# Patient Record
Sex: MALE | NOT HISPANIC OR LATINO | Employment: OTHER | URBAN - METROPOLITAN AREA
[De-identification: names, ages, dates, MRNs, and addresses within clinical notes are randomized per-mention and may not be internally consistent; named-entity substitution may affect disease eponyms.]

---

## 2023-11-27 ENCOUNTER — EVALUATION (OUTPATIENT)
Dept: PHYSICAL THERAPY | Facility: CLINIC | Age: 68
End: 2023-11-27
Payer: MEDICARE

## 2023-11-27 DIAGNOSIS — M12.812 ROTATOR CUFF ARTHROPATHY OF LEFT SHOULDER: ICD-10-CM

## 2023-11-27 DIAGNOSIS — S43.432D LABRAL TEAR OF SHOULDER, LEFT, SUBSEQUENT ENCOUNTER: Primary | ICD-10-CM

## 2023-11-27 DIAGNOSIS — M25.512 PAIN IN LEFT SHOULDER: ICD-10-CM

## 2023-11-27 PROCEDURE — 97110 THERAPEUTIC EXERCISES: CPT | Mod: GP | Performed by: PHYSICAL THERAPIST

## 2023-11-27 PROCEDURE — 97161 PT EVAL LOW COMPLEX 20 MIN: CPT | Mod: GP | Performed by: PHYSICAL THERAPIST

## 2023-11-27 ASSESSMENT — PATIENT HEALTH QUESTIONNAIRE - PHQ9
SUM OF ALL RESPONSES TO PHQ9 QUESTIONS 1 AND 2: 0
1. LITTLE INTEREST OR PLEASURE IN DOING THINGS: NOT AT ALL
2. FEELING DOWN, DEPRESSED OR HOPELESS: NOT AT ALL

## 2023-11-27 ASSESSMENT — ENCOUNTER SYMPTOMS
OCCASIONAL FEELINGS OF UNSTEADINESS: 0
DEPRESSION: 0
LOSS OF SENSATION IN FEET: 0

## 2023-11-27 NOTE — PROGRESS NOTES
Physical Therapy Evaluation    Patient Name: Hermann Lee  MRN: [id number]  Today's Date: 11/27/2023  Visit: 1/mn  Medicare Certification Dates:  11/27/23 - 1/27/24  Referred by:   Sy Locke  Time Calculation  Start Time: 1407  Stop Time: 1450  Time Calculation (min): 43 min  Diagnosis:   1. Rotator cuff arthropathy of left shoulder        2. Pain in left shoulder  Referral to Physical Therapy      3. Labral tear of shoulder, left, subsequent encounter                         PMH  - HTN, high cholesterol  - previously had skin CA on R forearm that was removed    HPI  He slipped on ice in March 2023 and landed on the L shoulder and arm.  He had immediate pain but did not need to go the ER.  He tried stretching but it hasn't helped.  He went to his MD a few weeks ago and was referred to PT.  No imaging done at this time.    Precautions  None    SUBJECTIVE  He reports soreness of the L shoulder but does not lack any motion or strength.  It is rated 0-5/10 and is located at the lateral L upper arm.  It catches when trying to lift over head at times.   It occurs intermittently.  The pain worsens with over head positions or after an activity.  It decreases by applying a pain patch (he cannot remember the name of it).  Functionally he is limited in sleeping, dressing and yard work.    Patient's Living Environment  Lives with his wife in a 2 story home.  He does have grab bars in his shower.      Previous Level of Function  No issues prior to March 2023    Patient's Therapy Goals  - resolve pain    OBJECTIVE  Observation:    - protracted shld on L.  L humeral head sits anterior  - atrophy of L RC mm notes.    Palpation:   - no pain    AROM:  - L shld flex = 135 with grinding in the shld with return (145 R).  With next attempt at flex, the shld caught and could not raise above 90 with scap compensation occurring into elevation.  Once the humeral head shifts into place again he can raise the arm over his head again.  abd  = 140 (145 R), ER = 70 and IR = 60.  - L shd PROM = WFL all dir with pain at end IR only    MMT:  - L shld flex = 3/5, abd = 4/5, ER = 3/5, IR = 5/5    Special Tests:  - Negative lift off and clunk test  -Positive jerks, speeds, cross over, empty can and GHL testing    Joint Mobility:   Increased motion of humeral head anteriorly    ASSESSMENT  Presents with S&S consistent with a L RC and labral injury effecting ROM, strength, humeral head position and humeral mechanics/coordination.  This causes his shoulder to catch, limiting ROM for over head ADLS.  He also has weakness and atrophy of the L RC mm which also effect movement and coordination of the shoulder joint.  He will benefit from physical therapy to improve pain, humeral positioning/mechanics and strength/stability of the L shld for return to all ADLS.    PLAN  2x/wk for 6 wks    TODAY'S TREATMENT  - evaluation of the L shld completed.  He was educated on his dx and the anatomy of the shoulder.  - he was given a HEP as seen below:    Access Code: [email or web address]  URL: [email or web address]  Date: 11/27/2023  Prepared by: Lili Hubbard    Exercises  - Seated Scapular Retraction  - 1 x daily - 7 x weekly - 3 sets - 10 reps  - Standing Isometric Shoulder Internal Rotation at Doorway  - 1 x daily - 7 x weekly - 3 sets - 10 reps  - Isometric Shoulder Extension at Wall  - 1 x daily - 7 x weekly - 3 sets - 10 reps  - Isometric Shoulder External Rotation at Wall  - 1 x daily - 7 x weekly - 3 sets - 10 reps  - Isometric Shoulder Abduction at Wall  - 1 x daily - 7 x weekly - 3 sets - 10 reps    GOALS:   he will be independent with his HEP  2.  achieve full AROM for dressing and over head ADLS  3.  demonstrate proper humeral head positioning to promote correct mechanics  4.  demonstrate shoulder flexion with proper humeral mechanics/coordination during shoulder flexion( without the humeral head shifting out of position).  5.  achieve 4+/5 RC strength  for lifting and over head ADLS

## 2023-11-27 NOTE — Clinical Note
January 10, 2024    Sy Locke DO  [address]  [place] [address]    Patient: Hermann Lee   YOB: 1955   Date of Visit: 11/27/2023       Dear Sy Locke Do  [address] [place] [address]    The attached plan of care is being sent to you because your patient’s medical reimbursement requires that you certify the plan of care. Your signature is required to allow uninterrupted insurance coverage.      You may indicate your approval by signing below and faxing this form back to us at Dept Fax: [phone].    Please call Dept: [phone] with any questions or concerns.    Thank you for this referral,        Lili Hubbard, PT  [facility] [address]  [address] [facility] [address]    Payer: Payor: MEDICARE / Plan: MEDICARE PART A AND B / Product Type: *No Product type* /                                                                         Date:     Dear Lili Hubbard, PT,     Re: Mr. Hermann Lee, MRN:[id number]    I certify that I have reviewed the attached plan of care and it is medically necessary for Mr. Hermann Lee (1955) who is under my care.          ______________________________________                    _________________  Provider name and credentials                                           Date and time                                                                                           Plan of Care 11/27/23   Effective from: 11/27/2023  Effective to: 1/27/2024    Active  Plan ID: [id number]           Participants as of 1/10/2024    Name Type Comments Contact Info    Sy Locke DO PCP - General  [phone]    Lili Hubbard, PT Consulting Physician  [phone]      Last Plan Note     Author: Lili Hubbard, PT Status: Signed Last edited: 12/28/2023  2:00 PM         Physical Therapy Treatment    Patient Name: Hermann Lee  MRN: [id number]  Today's Date: 12/28/2023   Visit 9/mn  Medicare Cert  Dates:  11/27/23 - 1/27/24  Referred by:  Sy Locke  Time Calculation  Start Time: 1402  Stop Time: 1452  Time Calculation (min): 50 min  Diagnosis:   1. Rotator cuff arthropathy of left shoulder        2. Labral tear of shoulder, left, subsequent encounter        3. Pain in left shoulder              SUBJECTIVE:  1-2/10 pain at the lateral L shld.  It is rated 2/10 in the morning and then decreases to 1/10.  He did well after last rx.  His flex AROM is improved and he has less difficulty at mid range.  This is still challenging during abd.     HEP compliance: yes    OBJECTIVE:  - PROM = WFL all dir  - AROM ER = 70, IR = 60, flex = 155, abd = 155  - MMT ER = 3+/5, IR = 5/5, abd = 4/5 with pain, flex =3/5  - Negative lift off, cross over and jerks  - Positive drop arm, empty can    Treatment:  -  continued with strength exercise for RC and scap stab mm.  Therapeutic Exercise  Therapeutic Exercise Activity 4: UBE, L4.5, 5 min  Therapeutic Exercise Activity 5: rows 50 lbs, 3x10  Therapeutic Exercise Activity 6: 6-way shld, GN TB, (no flex) 2 x10  Therapeutic Exercise Activity 7: lat pull down, 50 lbs, 3x10              - he was given a HEP to continue independently as seen below:    Access Code: [email or web address]  URL: [email or web address]  Date: 12/28/2023  Prepared by: Lili Hubbard    Exercises  - Standing Shoulder Row with Anchored Resistance  - 1 x daily - 4 x weekly - 3 sets - 10 reps  - Seated Lat Pull Down with Resistance - Elbows Bent  - 1 x daily - 4 x weekly - 3 sets - 10 reps  - Shoulder Extension with Resistance  - 1 x daily - 4 x weekly - 3 sets - 10 reps  - Standing Shoulder Horizontal Adduction with Anchored Resistance  - 1 x daily - 4 x weekly - 3 sets - 10 reps  - Sidelying Shoulder ER with Towel and Dumbbell  - 1 x daily - 4 x weekly - 3 sets - 10 reps    ASSESSMENT:  He has progressed very well in regard to pain and AROM.  However he still shows significant weakness into ER and  abd.  At times flex is hard to achieve as well.  Drop arm and empty can test are negative.  These signs indicate a RCT which is consistent with his mechanism of injury and continued limitations in therapy.  Recommend he see his family MD and ortho for imaging to r/o RCT.  As he is not reporting significant functional limitations he may opt to not have surgery even if a tear is present.  However, I think it would be good to have imaging done to determine the cause of his limitations.    PLAN:  Referred to his family MD and ortho.  ( He was given information for Dr. Bagley, Dr. Yeh, Dr. Benoit, Dr. Chi and Dr. Wren).  Hold PT until imaging is done.

## 2023-11-27 NOTE — LETTER
November 27, 2023    Sy Locke DO  [address] [facility] [address]    Patient: Hermann Lee   YOB: 1955   Date of Visit: 11/27/2023       Dear No referring provider defined for this encounter.    The attached plan of care is being sent to you because your patient’s medical reimbursement requires that you certify the plan of care. Your signature is required to allow uninterrupted insurance coverage.      You may indicate your approval by signing below and faxing this form back to us at Dept Fax: [phone].    Please call Dept: [phone] with any questions or concerns.    Thank you for this referral,        Lili Hubbard, PT  [facility] [address]  [address] [facility] [address]    Payer: Payor: MEDICARE / Plan: MEDICARE PART A AND B / Product Type: *No Product type* /                                                                         Date:     Dear Lili Hubbard, PT,     Re: Mr. Hermann Lee, MRN:[id number]    I certify that I have reviewed the attached plan of care and it is medically necessary for Mr. Hermann Lee (1955) who is under my care.          ______________________________________                    _________________  Provider name and credentials                                           Date and time                                                                                      The following plan is in draft form.  Please refer to the current version for the most up-to-date information.                Plan of Care 11/27/23   Effective from: 11/27/2023  Effective to: 1/27/2024    Draft  Plan ID: [id number]               Participants as of 11/27/2023      Name Type Comments Contact Info    Sy Locke DO PCP - General  [phone]    Lili Hubbard, PT Consulting Physician  [phone]          Last Plan Note       Author: Lili Hubbard PT Status: Incomplete Last edited: 11/27/2023  2:00 PM          Physical Therapy Evaluation    Patient Name: Hermann Lee  MRN: [id number]  Today's Date: 11/27/2023  Visit: 1/mn  Medicare Certification Dates:  11/27/23 - 1/27/23  Referred by:   Sy Locke  Time Calculation  Start Time: 1407  Stop Time: 1450  Time Calculation (min): 43 min  Diagnosis:   1. Rotator cuff arthropathy of left shoulder        2. Pain in left shoulder  Referral to Physical Therapy      3. Labral tear of shoulder, left, subsequent encounter                         PMH  - HTN, high cholesterol  - previously had skin CA on R forearm that was removed    HPI  He slipped on ice in March 2023 and landed on the L shoulder and arm.  He had immediate pain but did not need to go the ER.  He tried stretching but it hasn't helped.  He went to his MD a few weeks ago and was referred to PT.  No imaging done at this time.    Precautions  None    SUBJECTIVE  He reports soreness of the L shoulder but does not lack any motion or strength.  It is rated 0-5/10 and is located at the lateral L upper arm.  It catches when trying to lift over head at times.   It occurs intermittently.  The pain worsens with over head positions or after an activity.  It decreases by applying a pain patch (he cannot remember the name of it).  Functionally he is limited in sleeping, dressing and yard work.    Patient's Living Environment  Lives with his wife in a 2 story home.  He does have grab bars in his shower.      Previous Level of Function  No issues prior to March 2023    Patient's Therapy Goals  - resolve pain    OBJECTIVE  Observation:    - protracted shld on L.  L humeral head sits anterior  - atrophy of L RC mm notes.    Palpation:   - no pain    AROM:  - L shld flex = 135 with grinding in the shld with return (145 R).  With next attempt at flex, the shld caught and could not raise above 90 with scap compensation occurring into elevation.  Once the humeral head shifts into place again he can raise the arm over his head again.   abd = 140 (145 R), ER = 70 and IR = 60.  - L shd PROM = WFL all dir with pain at end IR only    MMT:  - L shld flex = 3/5, abd = 4/5, ER = 3/5, IR = 5/5    Special Tests:  - Negative lift off and clunk test  -Positive jerks, speeds, cross over, empty can and GHL testing    Joint Mobility:   Increased motion of humeral head anteriorly    ASSESSMENT  Presents with S&S consistent with a L RC and labral injury effecting ROM, strength, humeral head position and humeral mechanics/coordination.  This causes his shoulder to catch, limiting ROM for over head ADLS.  He also has weakness and atrophy of the L RC mm which also effect movement and coordination of the shoulder joint.  He will benefit from physical therapy to improve pain, humeral positioning/mechanics and strength/stability of the L shld for return to all ADLS.    PLAN  2x/wk for 6 wks    TODAY'S TREATMENT  - evaluation of the L shld completed.  He was educated on his dx and the anatomy of the shoulder.  - he was given a HEP as seen below:    Access Code: [email or web address]  URL: [email or web address]  Date: 11/27/2023  Prepared by: Lili Hubbard    Exercises  - Seated Scapular Retraction  - 1 x daily - 7 x weekly - 3 sets - 10 reps  - Standing Isometric Shoulder Internal Rotation at Doorway  - 1 x daily - 7 x weekly - 3 sets - 10 reps  - Isometric Shoulder Extension at Wall  - 1 x daily - 7 x weekly - 3 sets - 10 reps  - Isometric Shoulder External Rotation at Wall  - 1 x daily - 7 x weekly - 3 sets - 10 reps  - Isometric Shoulder Abduction at Wall  - 1 x daily - 7 x weekly - 3 sets - 10 reps    GOALS:   he will be independent with his HEP  2.  achieve full AROM for dressing and over head ADLS  3.  demonstrate proper humeral head positioning to promote correct mechanics  4.  demonstrate shoulder flexion with proper humeral mechanics/coordination during shoulder flexion( without the humeral head shifting out of position).  5.  achieve 4+/5 RC  strength for lifting and over head ADLS

## 2023-12-04 ENCOUNTER — TREATMENT (OUTPATIENT)
Dept: PHYSICAL THERAPY | Facility: CLINIC | Age: 68
End: 2023-12-04
Payer: MEDICARE

## 2023-12-04 DIAGNOSIS — M12.812 ROTATOR CUFF ARTHROPATHY OF LEFT SHOULDER: ICD-10-CM

## 2023-12-04 DIAGNOSIS — M25.512 PAIN IN LEFT SHOULDER: ICD-10-CM

## 2023-12-04 DIAGNOSIS — S43.432D LABRAL TEAR OF SHOULDER, LEFT, SUBSEQUENT ENCOUNTER: Primary | ICD-10-CM

## 2023-12-04 PROCEDURE — 97110 THERAPEUTIC EXERCISES: CPT | Mod: GP | Performed by: PHYSICAL THERAPIST

## 2023-12-04 NOTE — PROGRESS NOTES
Physical Therapy Treatment    Patient Name: Hermann Lee  MRN: [id number]  Today's Date: 12/4/2023   Visit 2/mn  Medicare Cert Dates:   11/27/23 - 1/27/24  Referred by:   Sy Locke  Time Calculation  Start Time: 1404  Stop Time: 1453  Time Calculation (min): 49 min  Diagnosis:   1. Labral tear of shoulder, left, subsequent encounter        2. Rotator cuff arthropathy of left shoulder        3. Pain in left shoulder              SUBJECTIVE:  He is feeling better.  2/10 L anterior/lateral shld pain that occurs with certain movements.  It is worse in the a.m. after having slept on it.  HEP compliance: yes    OBJECTIVE:  - continue to note atrophy of the L RC mm.  - challenged by ER in sidelying due to weakness.  Could not raise 2 lb weight to neutral    Treatment:  - initiated exercise for scap stab, posture, RC strengthening and stability of the GHJ.  Therapeutic Exercise  Therapeutic Exercise Activity 1: rhythm stab at 90 degrees of flex,  IR/ER at side  30 sec x2  Therapeutic Exercise Activity 2: S/L L shld ER, 2 lbs, 2x10  Therapeutic Exercise Activity 3: prone shld ext, 2x10  Therapeutic Exercise Activity 4: UBE, L4, 5 min  Therapeutic Exercise Activity 5: rows 45 lbs, 2x10  Therapeutic Exercise Activity 6: 6-way L shld iso with GN TB x10  Manual Therapy  Manual Therapy Activity 1: PROM all dir L shld  Manual Therapy Activity 2: post glide L GHJ             ASSESSMENT:  No pain after rx.  He needed cues for form but did not have pain.  He was challenged by ER due to RC weakness.  He will benefit from continued therapy to continue working on RC strength and shld stab due to suspected labral and RC injury.    PLAN:  Increase reps as nghia.

## 2023-12-06 ENCOUNTER — TREATMENT (OUTPATIENT)
Dept: PHYSICAL THERAPY | Facility: CLINIC | Age: 68
End: 2023-12-06
Payer: MEDICARE

## 2023-12-06 DIAGNOSIS — M25.512 PAIN IN LEFT SHOULDER: ICD-10-CM

## 2023-12-06 DIAGNOSIS — S43.432D LABRAL TEAR OF SHOULDER, LEFT, SUBSEQUENT ENCOUNTER: Primary | ICD-10-CM

## 2023-12-06 DIAGNOSIS — M12.812 ROTATOR CUFF ARTHROPATHY OF LEFT SHOULDER: ICD-10-CM

## 2023-12-06 PROCEDURE — 97110 THERAPEUTIC EXERCISES: CPT | Mod: GP | Performed by: PHYSICAL THERAPIST

## 2023-12-06 NOTE — PROGRESS NOTES
Physical Therapy Treatment    Patient Name: Hermann Lee  MRN: [id number]  Today's Date: 12/6/2023   Visit 3/mn  Medicare Cert Dates:  11/27/23 - 1/27/24  Referred by:   Sy Locke     Diagnosis:   1. Labral tear of shoulder, left, subsequent encounter        2. Rotator cuff arthropathy of left shoulder        3. Pain in left shoulder              SUBJECTIVE:  0/10 pain at left shoulder even when moving it.  He is still avoiding lifting.    HEP compliance: yes    OBJECTIVE:  - L shld AROM ER = 78, IR = 60, Flex = 80 (due to the shoulder being out of alignment).  Flex increased to 120 after doing pulley flex exercise, abd = 150  - L shld PROm = WNL all dir    Treatment:  - continued with exercise for ROM, posture, strength and shld stability/mechanics.  Added pulleys to gain flex ROM.  Added supine shld flex with weight to work on  mechanics and strength in a gravity eliminated position.  Decreased weight for S/L ER as 2 lbs was very challenging during last session.  Increased reps of exercise as nghia.    Therapeutic Exercise  Therapeutic Exercise Activity 2: S/L L shld ER, 2 lbs, 2x10  Therapeutic Exercise Activity 3: prone shld ext, 3x10  Therapeutic Exercise Activity 4: UBE, L1, 5 min  Therapeutic Exercise Activity 5: rows 45 lbs, 3x10  Therapeutic Exercise Activity 6: 6-way L shld iso with GN TB x10  Therapeutic Exercise Activity 7: pulleys, flex, 2x10  Therapeutic Exercise Activity 8: supine shld flex, 2 lbs, 2x10  Manual Therapy  Manual Therapy Activity 1: PROM all dir L shld  Manual Therapy Activity 2: post and inf glide L GHJ           - HEP updated as seen below:    Access Code: [email or web address]  URL: [email or web address]  Date: 12/06/2023  Prepared by: Lili Hubbard    Exercises  - Standing Shoulder Flexion Reactive Isometrics with Elbow Extended  - 1 x daily - 7 x weekly - 3 sets - 10 reps  - Supine Shoulder Flexion with Free Weight  - 1 x daily - 4 x weekly - 3 sets - 10 reps  -  Prone Scapular Slide with Shoulder Extension  - 1 x daily - 7 x weekly - 3 sets - 10 reps  - Shoulder External Rotation Reactive Isometrics  - 1 x daily - 7 x weekly - 3 sets - 10 reps    ASSESSMENT:  Good tolerance of new exercises.  Continues to have catching with altered mechanics at approximately 90 degrees of flexion.  He is able to move past that point when focusing on mechanics.  He is progressing and demonstrates much improved symptoms.  He will benefit from continued therapy to improve shoulder ROM into flex as well as stability and strength for ADLS.    PLAN:  Continue with current exercises.

## 2023-12-11 ENCOUNTER — TREATMENT (OUTPATIENT)
Dept: PHYSICAL THERAPY | Facility: CLINIC | Age: 68
End: 2023-12-11
Payer: MEDICARE

## 2023-12-11 DIAGNOSIS — M25.512 PAIN IN LEFT SHOULDER: ICD-10-CM

## 2023-12-11 DIAGNOSIS — M12.812 ROTATOR CUFF ARTHROPATHY OF LEFT SHOULDER: ICD-10-CM

## 2023-12-11 DIAGNOSIS — S43.432D LABRAL TEAR OF SHOULDER, LEFT, SUBSEQUENT ENCOUNTER: Primary | ICD-10-CM

## 2023-12-11 PROCEDURE — 97010 HOT OR COLD PACKS THERAPY: CPT | Mod: GP | Performed by: PHYSICAL THERAPIST

## 2023-12-11 PROCEDURE — 97110 THERAPEUTIC EXERCISES: CPT | Mod: GP | Performed by: PHYSICAL THERAPIST

## 2023-12-11 NOTE — PROGRESS NOTES
Physical Therapy Treatment    Patient Name: Hermann Lee  MRN: [id number]  Today's Date: 12/11/2023   Visit 4/mn Medicare Cert Dates:  11/27/23 - 1/27/24  Referred by:  Sy correia  Time Calculation  Start Time: 1443  Stop Time: 1545  Time Calculation (min): 62 min  Diagnosis:   1. Labral tear of shoulder, left, subsequent encounter        2. Rotator cuff arthropathy of left shoulder        3. Pain in left shoulder              SUBJECTIVE:  0/10 pain.  He did have some pain after raking his leaves a few days ago, but it resolved.  HEP compliance: yes    OBJECTIVE:  - L shld AROM flex = 135    Treatment:  - continued with exercises for RC strengthening and stabilizing the GHJ.  Advanced 6-way shld to isotonics  Therapeutic Exercise  Therapeutic Exercise Activity 2: S/L L shld ER, 1 lbs, 2x10  Therapeutic Exercise Activity 3: prone shld ext, 3x10  Therapeutic Exercise Activity 4: UBE, L4, 5 min  Therapeutic Exercise Activity 5: rows 45 lbs, 3x10  Therapeutic Exercise Activity 6: 6-way shd (no abd), GN TB, x10  Therapeutic Exercise Activity 7: pulleys, flex, 2x10  Therapeutic Exercise Activity 8: supine shld flex, 2 lbs, 2x10  Manual Therapy  Manual Therapy Activity 1: PROM all dir L shld  Manual Therapy Activity 2: post and inf glide L GHJ        Modalities  Modality 1: Untimed Cold Pack (CP to L shld for 10 min after exercise)    ASSESSMENT:  AROM flex has returned to 135 with improved mechanics at midrange (less catching at 90 degrees).  However, if he holds a weight, he cannot lift the arm forward above approximately 60 degrees.  He continues to present as a RC injury with possible labral injury after falling on ice in march.  We will continue 2-3 more sessions to see if further progress in ROM, strength and mechanics can be made.  At that time he will be referred back to his MD for imaging if no further improvement seen.    PLAN:  Continue with exercises for RC strengthening.  Add S/L abd.

## 2023-12-13 ENCOUNTER — TREATMENT (OUTPATIENT)
Dept: PHYSICAL THERAPY | Facility: CLINIC | Age: 68
End: 2023-12-13
Payer: MEDICARE

## 2023-12-13 DIAGNOSIS — S43.432D LABRAL TEAR OF SHOULDER, LEFT, SUBSEQUENT ENCOUNTER: Primary | ICD-10-CM

## 2023-12-13 DIAGNOSIS — M25.512 PAIN IN LEFT SHOULDER: ICD-10-CM

## 2023-12-13 DIAGNOSIS — M12.812 ROTATOR CUFF ARTHROPATHY OF LEFT SHOULDER: ICD-10-CM

## 2023-12-13 PROCEDURE — 97140 MANUAL THERAPY 1/> REGIONS: CPT | Mod: GP | Performed by: PHYSICAL THERAPIST

## 2023-12-13 PROCEDURE — 97010 HOT OR COLD PACKS THERAPY: CPT | Mod: GP | Performed by: PHYSICAL THERAPIST

## 2023-12-13 PROCEDURE — 97110 THERAPEUTIC EXERCISES: CPT | Mod: GP | Performed by: PHYSICAL THERAPIST

## 2023-12-13 NOTE — PROGRESS NOTES
Physical Therapy Treatment    Patient Name: Hermann Lee  MRN: [id number]  Today's Date: 12/13/2023   Visit 5/mn  Medicare Cert Dates:  11/27/23 - 1/27/24  Referred by:   Sy Locke  Time Calculation  Start Time: 1406  Stop Time: 1455  Time Calculation (min): 49 min  Diagnosis:   1. Labral tear of shoulder, left, subsequent encounter        2. Rotator cuff arthropathy of left shoulder        3. Pain in left shoulder              SUBJECTIVE:  He has good/bad days with the L shld.  It felt good all day yesterday, but has pain today.  He may have slept on his L side.  3/10 pain today at the lateral L shld that occurs intermittently.  He felt good after his last session.  Ice helps.    HEP compliance: yes    OBJECTIVE:  - L shld AROM flex = 142, abd = 143    Treatment:  - continued with exercise for L shld strength, ROM and mechanics.  Increased reps as nghia.  Added S/L abd.  Needed assist to stabilize scap and complete the motion as humeral mechanics were incorrect (scap elevation and rotating   Therapeutic Exercise  Therapeutic Exercise Activity 2: S/L L shld ER, 1 lbs, 2x10  Therapeutic Exercise Activity 3: prone L shld ext off side of table, 2 lbs, 2x10  Therapeutic Exercise Activity 4: UBE, L4, 5 min  Therapeutic Exercise Activity 5: rows 45 lbs, 3x10  Therapeutic Exercise Activity 6: 6-way shd (no abd), GN TB, x10  Therapeutic Exercise Activity 8: supine shld flex, 2 lbs, 3x10  Therapeutic Exercise Activity 9: S/L L shld abd x5 (with assist)  Manual Therapy  Manual Therapy Activity 1: PROM all dir L shld  Manual Therapy Activity 2: post and inf glide L GHJ        Modalities  Modality 1: Untimed Cold Pack (CP to L shld for 10 min in supine after exercise)    ASSESSMENT:  Demonstrates improved AROM into flex and abd.  Continued to suspect RC injury as strength continues to be an issue despite improve AROM.  any additional weight (1 lb) into abd or ER is very challenging and he cannot complete the range.  He  will benefit from continued therapy to improve ROM and strength for return to dressing, sleeping and yard work without pain.  Will continue to monitor progress and will refer back to MD if it plateaus.     PLAN:  Continue with RC strengthening.  Increase reps as nghia.

## 2023-12-18 ENCOUNTER — TREATMENT (OUTPATIENT)
Dept: PHYSICAL THERAPY | Facility: CLINIC | Age: 68
End: 2023-12-18
Payer: MEDICARE

## 2023-12-18 DIAGNOSIS — M12.812 ROTATOR CUFF ARTHROPATHY OF LEFT SHOULDER: ICD-10-CM

## 2023-12-18 DIAGNOSIS — M25.512 PAIN IN LEFT SHOULDER: ICD-10-CM

## 2023-12-18 DIAGNOSIS — S43.432D LABRAL TEAR OF SHOULDER, LEFT, SUBSEQUENT ENCOUNTER: Primary | ICD-10-CM

## 2023-12-18 PROCEDURE — 97110 THERAPEUTIC EXERCISES: CPT | Mod: GP | Performed by: PHYSICAL THERAPIST

## 2023-12-18 PROCEDURE — 97140 MANUAL THERAPY 1/> REGIONS: CPT | Mod: GP | Performed by: PHYSICAL THERAPIST

## 2023-12-18 NOTE — PROGRESS NOTES
Physical Therapy Treatment    Patient Name: Hermann Lee  MRN: [id number]  Today's Date: 12/18/2023   Visit 6/mn Medicare Cert Dates:  11/27/23 - 1/27/24  Referred by:   Sy Locke  Time Calculation  Start Time: 1232  Stop Time: 1316  Time Calculation (min): 44 min  Diagnosis:   1. Labral tear of shoulder, left, subsequent encounter        2. Rotator cuff arthropathy of left shoulder        3. Pain in left shoulder              SUBJECTIVE:  He did well over the weekend, but woke with pain today (maybe from his sleep position).  He used some ice and it helped.  1-2/10 pain now located at the lateral L shld.  HEP compliance: yes    OBJECTIVE:  - L shld AROM flex = 150 B (difficulty with transitioning over head when reaching about 90 degrees on the L side)  - L shld PROM = WNL all dir    Treatment:  - continued exercises for L shld ROM and strength.  Emphasized strength and proper mechanics at mid range movement.  Therapeutic Exercise  Therapeutic Exercise Activity 2: S/L L shld ER, 1 lbs, 3x10  Therapeutic Exercise Activity 3: prone L shld ext off side of table, 2 lbs, 3x10  Therapeutic Exercise Activity 4: UBE, L4, 5 min  Therapeutic Exercise Activity 5: rows 50 lbs, 3x10  Therapeutic Exercise Activity 6: 6-way shld, GN TB, x10  Therapeutic Exercise Activity 8: supine shld flex, 2 lbs, 3x10  Therapeutic Exercise Activity 9: S/L L shld abd [id number] reps as nghia.  Manual Therapy  Manual Therapy Activity 1: PROM all dir L shld             ASSESSMENT:  Good tolerance of advanced exercises.  Demonstrates equal AROM into flex at B shlds.  However, he still has difficulty at midrange flex and abd as well as ER due to RC weakness.  Today he required less assistance for sidelying abd.  He will benefit from continued therapy to further improve RC strength for over head and lifting ADLS.    PLAN:  Continue to strengthen into flex, abd and ER.

## 2023-12-20 ENCOUNTER — TREATMENT (OUTPATIENT)
Dept: PHYSICAL THERAPY | Facility: CLINIC | Age: 68
End: 2023-12-20
Payer: MEDICARE

## 2023-12-20 DIAGNOSIS — M25.512 PAIN IN LEFT SHOULDER: ICD-10-CM

## 2023-12-20 DIAGNOSIS — M12.812 ROTATOR CUFF ARTHROPATHY OF LEFT SHOULDER: ICD-10-CM

## 2023-12-20 DIAGNOSIS — S43.432D LABRAL TEAR OF SHOULDER, LEFT, SUBSEQUENT ENCOUNTER: Primary | ICD-10-CM

## 2023-12-20 PROCEDURE — 97110 THERAPEUTIC EXERCISES: CPT | Mod: GP | Performed by: PHYSICAL THERAPIST

## 2023-12-20 NOTE — PROGRESS NOTES
"  Physical Therapy Treatment    Patient Name: Hermann Lee  MRN: [id number]  Today's Date: 12/20/2023   Visit 7/mn Medicare Cert Dates:  11/27/23 - 1/27/24  Referred by:  Sy Locke  Time Calculation  Start Time: 1417  Stop Time: 1503  Time Calculation (min): 46 min  Diagnosis:   1. Labral tear of shoulder, left, subsequent encounter        2. Rotator cuff arthropathy of left shoulder        3. Pain in left shoulder              SUBJECTIVE:  He shoveled snow yesterday and that caused L shld pain.  He iced it and it feels \"pretty good\" now.  1-2/10 pain at the lateral L shld that occurs with movement.    HEP compliance: yes    OBJECTIVE:  - L shld AROM flex = 150, abd = 145, ER = 75 and IR = 65  - PROM L shld = WFL all dir  - L shld MMT flex = 4-/5 with crepitus, abd = 4/5 with pain, IR = 5/5 and ER = 3+/5.    Treatment:  - continued with RC strengthening and shld stabilizing exercises.  Increased reps of 6-way shld.  Therapeutic Exercise  Therapeutic Exercise Activity 2: S/L L shld ER, 1 lbs, 3x10  Therapeutic Exercise Activity 3: prone L shld ext off side of table, 2 lbs, 3x10  Therapeutic Exercise Activity 4: UBE, L4.5, 5 min  Therapeutic Exercise Activity 5: rows 50 lbs, 3x10  Therapeutic Exercise Activity 6: 6-way shld, GN TB, (no flex) 2 x10  Therapeutic Exercise Activity 8: supine shld flex, 2 lbs, 3x10  Therapeutic Exercise Activity 9: S/L L shld abd x10                ASSESSMENT:  Continue to suspect labral and RC injury - possible tear.  However he demonstrates improvement with treatment thus far.  AROM equals the R and He has increased strength into flex and ER.  Significant weakness still present into ER and abd, however as expected with a RC tear.  We will continue in therapy for approximately 2 more sessions to see if further progress is made.  If not, he will be referred back to his MD for further imaging.    PLAN:  Continued with strength exercise.    Refer back to MD in approximately 2 sessions " depending on progress.

## 2023-12-26 ENCOUNTER — TREATMENT (OUTPATIENT)
Dept: PHYSICAL THERAPY | Facility: CLINIC | Age: 68
End: 2023-12-26
Payer: MEDICARE

## 2023-12-26 DIAGNOSIS — M25.512 PAIN IN LEFT SHOULDER: ICD-10-CM

## 2023-12-26 DIAGNOSIS — S43.432D LABRAL TEAR OF SHOULDER, LEFT, SUBSEQUENT ENCOUNTER: ICD-10-CM

## 2023-12-26 DIAGNOSIS — M12.812 ROTATOR CUFF ARTHROPATHY OF LEFT SHOULDER: Primary | ICD-10-CM

## 2023-12-26 PROCEDURE — 97010 HOT OR COLD PACKS THERAPY: CPT | Mod: GP | Performed by: PHYSICAL THERAPIST

## 2023-12-26 PROCEDURE — 97110 THERAPEUTIC EXERCISES: CPT | Mod: GP | Performed by: PHYSICAL THERAPIST

## 2023-12-26 NOTE — PROGRESS NOTES
Physical Therapy Treatment    Patient Name: Hermann Lee  MRN: [id number]  Today's Date: 12/26/2023   Visit 8/mn  Medicare Cert Dates: 11/27/23 - 1/27/24  Referred by:  Sy Locke  Time Calculation  Start Time: 1402  Stop Time: 1500  Time Calculation (min): 58 min  Diagnosis:   1. Rotator cuff arthropathy of left shoulder        2. Labral tear of shoulder, left, subsequent encounter        3. Pain in left shoulder              SUBJECTIVE:  He did well all weekend.  A little sore in the morning.   It is rated 1-2/10 and located at the lateral L shld.   He did well after last session.    HEP compliance: yes    OBJECTIVE:  - PROM = WNL all dir  - AROM ER = 75, IR = 70, flex = 155, abd = 155    Treatment:  - continued with exercise for strengthening the L shld.  Added lat pull down.  Therapeutic Exercise  Therapeutic Exercise Activity 2: S/L L shld ER, 1 lbs, 3x10  Therapeutic Exercise Activity 3: prone L shld ext off side of table, 2 lbs, 3x10  Therapeutic Exercise Activity 4: UBE, L4.5, 5 min  Therapeutic Exercise Activity 5: rows 50 lbs, 3x10  Therapeutic Exercise Activity 6: 6-way shld, GN TB, (no flex) 2 x10  Therapeutic Exercise Activity 7: lat pull down, 50 lbs, 3x10  Therapeutic Exercise Activity 8: supine shld flex, 2 lbs, 3x10  Therapeutic Exercise Activity 9: S/L L shld abd 2x10  Manual Therapy  Manual Therapy Activity 1: PROM all dir L shld        Modalities  Modality 1: Untimed Cold Pack (CP to L shld for 10 min after exercise)    ASSESSMENT:  Demonstrates increased AROM into flex and abd.  Crepitus present primarily during abd.  He also compensates with scap elevation during abd due to suspected RCT.    PLAN:  Continue PT to increase RC strength.  His motion is improving, however I do still suspect a RCT.  May refer back to MD at next session.

## 2023-12-28 ENCOUNTER — TREATMENT (OUTPATIENT)
Dept: PHYSICAL THERAPY | Facility: CLINIC | Age: 68
End: 2023-12-28
Payer: MEDICARE

## 2023-12-28 DIAGNOSIS — M12.812 ROTATOR CUFF ARTHROPATHY OF LEFT SHOULDER: Primary | ICD-10-CM

## 2023-12-28 DIAGNOSIS — M25.512 PAIN IN LEFT SHOULDER: ICD-10-CM

## 2023-12-28 DIAGNOSIS — S43.432D LABRAL TEAR OF SHOULDER, LEFT, SUBSEQUENT ENCOUNTER: ICD-10-CM

## 2023-12-28 PROCEDURE — 97110 THERAPEUTIC EXERCISES: CPT | Mod: GP | Performed by: PHYSICAL THERAPIST

## 2023-12-28 NOTE — PROGRESS NOTES
Physical Therapy Treatment    Patient Name: Hermann Lee  MRN: [id number]  Today's Date: 12/28/2023   Visit 9/mn  Medicare Cert Dates:  11/27/23 - 1/27/24  Referred by:  Sy Locke  Time Calculation  Start Time: 1402  Stop Time: 1452  Time Calculation (min): 50 min  Diagnosis:   1. Rotator cuff arthropathy of left shoulder        2. Labral tear of shoulder, left, subsequent encounter        3. Pain in left shoulder              SUBJECTIVE:  1-2/10 pain at the lateral L shld.  It is rated 2/10 in the morning and then decreases to 1/10.  He did well after last rx.  His flex AROM is improved and he has less difficulty at mid range.  This is still challenging during abd.     HEP compliance: yes    OBJECTIVE:  - PROM = WFL all dir  - AROM ER = 70, IR = 60, flex = 155, abd = 155  - MMT ER = 3+/5, IR = 5/5, abd = 4/5 with pain, flex =3/5  - Negative lift off, cross over and jerks  - Positive drop arm, empty can    Treatment:  -  continued with strength exercise for RC and scap stab mm.  Therapeutic Exercise  Therapeutic Exercise Activity 4: UBE, L4.5, 5 min  Therapeutic Exercise Activity 5: rows 50 lbs, 3x10  Therapeutic Exercise Activity 6: 6-way shld, GN TB, (no flex) 2 x10  Therapeutic Exercise Activity 7: lat pull down, 50 lbs, 3x10              - he was given a HEP to continue independently as seen below:    Access Code: [email or web address]  URL: [email or web address]  Date: 12/28/2023  Prepared by: Lili Hubbard    Exercises  - Standing Shoulder Row with Anchored Resistance  - 1 x daily - 4 x weekly - 3 sets - 10 reps  - Seated Lat Pull Down with Resistance - Elbows Bent  - 1 x daily - 4 x weekly - 3 sets - 10 reps  - Shoulder Extension with Resistance  - 1 x daily - 4 x weekly - 3 sets - 10 reps  - Standing Shoulder Horizontal Adduction with Anchored Resistance  - 1 x daily - 4 x weekly - 3 sets - 10 reps  - Sidelying Shoulder ER with Towel and Dumbbell  - 1 x daily - 4 x weekly - 3 sets - 10  reps    ASSESSMENT:  He has progressed very well in regard to pain and AROM.  However he still shows significant weakness into ER and abd.  At times flex is hard to achieve as well.  Drop arm and empty can test are positive.  These signs indicate a RCT which is consistent with his mechanism of injury and continued limitations in therapy.  Recommend he see his family MD and ortho for imaging to r/o RCT.  As he is not reporting significant functional limitations he may opt to not have surgery even if a tear is present.  However, I think it would be good to have imaging done to determine the cause of his limitations.    PLAN:  Referred to his family MD and ortho.  ( He was given information for Dr. aBgley, Dr. Yeh, Dr. Benoit, Dr. Chi and Dr. Wren).  Hold PT until imaging is done.

## 2024-03-04 ENCOUNTER — DOCUMENTATION (OUTPATIENT)
Dept: PHYSICAL THERAPY | Facility: CLINIC | Age: 69
End: 2024-03-04
Payer: MEDICARE

## 2024-03-04 NOTE — PROGRESS NOTES
Physical Therapy    Discharge Summary    Name: Hermann Lee  MRN: [id number]  : 1955  Date: 24    Discharge Summary: PT    Discharge Information: Date of discharge 3/4/24, Date of last visit 23, Date of evaluation 23, Number of attended visits 9, Referred by Sy Locke, and Referred for L shld pain, RC injury, labral tear    Therapy Summary: at his last session he reported 1-2/10 pain at the lateral L shld.  His AROM improved and he had less difficulty at midrange flexion, but this persisted with abd.  He demonstrated:  - PROM = WFL all dir  - AROM ER = 70, IR = 60, flex = 155, abd = 155  - MMT ER = 3+/5, IR = 5/5, abd = 4/5 with pain, flex =3/5  - Negative lift off, cross over and jerks  - Positive drop arm, empty can    He has progressed very well in regard to pain and AROM.  However he still shows significant weakness into ER and abd.  At times flex is hard to achieve as well.  Drop arm and empty can test are positive.  These signs indicate a RCT which is consistent with his mechanism of injury and continued limitations in therapy.  Recommend he see his family MD and ortho for imaging to r/o RCT.  As he is not reporting significant functional limitations he may opt to not have surgery even if a tear is present.  However, I think it would be good to have imaging done to determine the cause of his limitations.   Therapy was put on hold until he saw his MD and had imaging.  He did not return to PT.      Rehab Discharge Reason: Other referred back to his MD - did not return to PT.

## 2024-10-31 ENCOUNTER — EVALUATION (OUTPATIENT)
Dept: PHYSICAL THERAPY | Facility: CLINIC | Age: 69
End: 2024-10-31
Payer: MEDICARE

## 2024-10-31 DIAGNOSIS — M51.369 DISC DEGENERATION, LUMBAR: ICD-10-CM

## 2024-10-31 DIAGNOSIS — M54.50 LOW BACK PAIN: Primary | ICD-10-CM

## 2024-10-31 PROCEDURE — 97161 PT EVAL LOW COMPLEX 20 MIN: CPT | Mod: GP | Performed by: PHYSICAL THERAPIST

## 2024-10-31 PROCEDURE — 97110 THERAPEUTIC EXERCISES: CPT | Mod: GP | Performed by: PHYSICAL THERAPIST

## 2024-10-31 ASSESSMENT — PATIENT HEALTH QUESTIONNAIRE - PHQ9
2. FEELING DOWN, DEPRESSED OR HOPELESS: NOT AT ALL
1. LITTLE INTEREST OR PLEASURE IN DOING THINGS: NOT AT ALL
SUM OF ALL RESPONSES TO PHQ9 QUESTIONS 1 AND 2: 0

## 2024-10-31 ASSESSMENT — ENCOUNTER SYMPTOMS
DEPRESSION: 0
LOSS OF SENSATION IN FEET: 0
OCCASIONAL FEELINGS OF UNSTEADINESS: 0

## 2024-11-05 ENCOUNTER — TREATMENT (OUTPATIENT)
Dept: PHYSICAL THERAPY | Facility: CLINIC | Age: 69
End: 2024-11-05
Payer: MEDICARE

## 2024-11-05 DIAGNOSIS — M51.369 DISC DEGENERATION, LUMBAR: ICD-10-CM

## 2024-11-05 DIAGNOSIS — M54.50 LOW BACK PAIN: ICD-10-CM

## 2024-11-05 PROCEDURE — 97110 THERAPEUTIC EXERCISES: CPT | Mod: GP | Performed by: PHYSICAL THERAPIST

## 2024-11-05 NOTE — PROGRESS NOTES
Physical Therapy Treatment    Patient Name: Hermann Lee  MRN: [id number]  Today's Date: 11/5/2024  Visit 2/mn  DOS/DOI:  4/1/24  Medicare Certification Dates:  10/31/24 - 12/31/24  Referred by:   Cesilia Bynum    Time Calculation  Start Time: 1751  Stop Time: 1835  Time Calculation (min): 44 min     PT Therapeutic Procedures Time Entry  Therapeutic Exercise Time Entry: 44                 Diagnosis:   1. Low back pain  Follow Up In Physical Therapy      2. Disc degeneration, lumbar  Follow Up In Physical Therapy            PRECAUTIONS:  none    SUBJECTIVE:  He reports constant 2-3/10 pain described as an ache at his low back.  HEP compliance: yes    OBJECTIVE:  - TA = fair    Treatment:  - initiated exercise for core stab.  Focused on TA, multifidus and gluts.  Exercises done in   Therapeutic Exercise  Therapeutic Exercise Activity 1: SKTC B, 30 sec x2  Therapeutic Exercise Activity 2: TA with hip add x10  Therapeutic Exercise Activity 3: knee tap 2 x10  Therapeutic Exercise Activity 4: bridge 2x10  Therapeutic Exercise Activity 5: hip abd on  multi hip machine, B, L3 2x10  Therapeutic Exercise Activity 6: quad LE ext x 5 B  Therapeutic Exercise Activity 7: supine SB for obliques, B x5  Therapeutic Exercise Activity 8: row, 40 lbs 2x10  Therapeutic Exercise Activity 9: shld ext in standing, 35 lbs 2x10              - additional exercises were added to his Hep as he will not be back to PT for about 10 days.    Access Code: [email or web address]  URL: [email or web address]  Date: 11/05/2024  Prepared by: Lili Hubbard    Exercises  - Quadruped Alternating Leg Extensions  - 1 x daily - 7 x weekly - 3 sets - 10 reps  - Curl Up with Reach  - 1 x daily - 7 x weekly - 3 sets - 10 reps    ASSESSMENT:  He reports a little aching in the low back after exercise but 0/10 pain during today's session.  He will benefit from continued PT to improve core stab for decreased pain when transferring from one position to  the next.      PLAN:  Increase reps as nghia.

## 2024-11-15 ENCOUNTER — TREATMENT (OUTPATIENT)
Dept: PHYSICAL THERAPY | Facility: CLINIC | Age: 69
End: 2024-11-15
Payer: MEDICARE

## 2024-11-15 DIAGNOSIS — M54.50 LOW BACK PAIN: ICD-10-CM

## 2024-11-15 DIAGNOSIS — M51.369 DISC DEGENERATION, LUMBAR: ICD-10-CM

## 2024-11-15 PROCEDURE — 97110 THERAPEUTIC EXERCISES: CPT | Mod: GP | Performed by: PHYSICAL THERAPIST

## 2024-11-15 NOTE — PROGRESS NOTES
Physical Therapy Treatment    Patient Name: Hermann Lee  MRN: [id number]  Today's Date: 11/15/2024  Visit 3/mn  DOS/DOI:  4/1/24  Medicare Certification Dates:  10/31/24 - 12/31/24  Referred by:   Cesilia Bynum    Time Calculation  Start Time: 1004  Stop Time: 1048  Time Calculation (min): 44 min     PT Therapeutic Procedures Time Entry  Manual Therapy Time Entry: 7  Therapeutic Exercise Time Entry: 37                 Diagnosis:   1. Low back pain  Follow Up In Physical Therapy      2. Disc degeneration, lumbar  Follow Up In Physical Therapy            PRECAUTIONS:  none    SUBJECTIVE:  Last weekend he had increased pain rated 5/10 in R LB.  It may have been from doing more sitting that day.  He had rakes leaves 2 day before but this may not be the cause.  It has reduced to 1/10.  HEP compliance: yes    OBJECTIVE:  - AROM lumbar flex = WNL, ext = 10 with R LBP, SB = WFL B, rot  = WFL B  - Negative slump    Treatment:  - continued with core stab.  Increased reps as nghia.  Therapeutic Exercise  Therapeutic Exercise Activity 1: SKTC B, 30 sec x2  Therapeutic Exercise Activity 2: TA with hip add x10  Therapeutic Exercise Activity 3: knee tap 3 x10  Therapeutic Exercise Activity 4: bridge 3x10  Therapeutic Exercise Activity 5: hip abd on multi hip machine, B, L3 2x10  Therapeutic Exercise Activity 6: quad LE ext x 10 B  Therapeutic Exercise Activity 7: supine SB for obliques, B x5  Therapeutic Exercise Activity 8: row, 40 lbs 2x10  Therapeutic Exercise Activity 9: shld ext in standing, 35 lbs 2x10  Manual Therapy  Manual Therapy Activity 1: R lower lumbar gap mob             ASSESSMENT:  Nghia rx well - no problem with increased reps during exercise and felt the manual therapy was helpful.  After rx he demonstrates lumbar ext without pain.  He did report a little soreness in the R low back after exercise.  He opted to ice at home vs in the clinic.  He will benefit from continued therapy to further improve core  stability.      PLAN:  Add oblique knee taps.

## 2024-11-21 ENCOUNTER — TREATMENT (OUTPATIENT)
Dept: PHYSICAL THERAPY | Facility: CLINIC | Age: 69
End: 2024-11-21
Payer: MEDICARE

## 2024-11-21 DIAGNOSIS — M54.50 LOW BACK PAIN: ICD-10-CM

## 2024-11-21 DIAGNOSIS — M51.369 DISC DEGENERATION, LUMBAR: ICD-10-CM

## 2024-11-21 PROCEDURE — 97110 THERAPEUTIC EXERCISES: CPT | Mod: GP | Performed by: PHYSICAL THERAPIST

## 2024-11-21 NOTE — PROGRESS NOTES
Physical Therapy Treatment    Patient Name: Hermann Lee  MRN: [id number]  Today's Date: 11/21/2024  Visit 4/m,n  DOS/DOI:  4/1/24  Medicare Certification Dates:  10/31/24 - 12/31/24  Referred by:   Cesilia Bynum     Time Calculation  Start Time: 1046  Stop Time: 1131  Time Calculation (min): 45 min     PT Therapeutic Procedures Time Entry  Manual Therapy Time Entry: 5  Therapeutic Exercise Time Entry: 40                 Diagnosis:   1. Low back pain  Follow Up In Physical Therapy      2. Disc degeneration, lumbar  Follow Up In Physical Therapy            PRECAUTIONS:  none    SUBJECTIVE:  1-2/10 pain at the L LB today.  The pain is intermittent.  The aggravating factors vary.  Sometimes lumbar flexion causes pain and other times it does not.  He felt good after his last session but was sore the next day.  HEP compliance: yes    OBJECTIVE:  - Lumbar AROM = WFL all dir without pain  - Negative slump, B SLR  - TA = strong  - HS flexibility with SLR approximately 60 degrees B    Treatment:  - continued with core stab exercise.  Added oblique crunches.  Therapeutic Exercise  Therapeutic Exercise Activity 1: SKTC B, 30 sec x2  Therapeutic Exercise Activity 2: Oblique knee tap 2x10  Therapeutic Exercise Activity 3: knee tap 3 x10  Therapeutic Exercise Activity 4: bridge 3x10  Therapeutic Exercise Activity 5: hip abd on multi hip machine, B, L3 2x10  Therapeutic Exercise Activity 6: quad LE ext x 10 B  Therapeutic Exercise Activity 7: supine SB for obliques, B x10  Therapeutic Exercise Activity 8: row, 40 lbs 2x10  Therapeutic Exercise Activity 9: shld ext in standing, 35 lbs 2x10  Manual Therapy  Manual Therapy Activity 1: R lower lumbar gap mob             ASSESSMENT:  J Luis rx well.  He has met several goals already.  The location and aggravating factors vary.  He will benefit from continued therapy to further stabilize the spine to reduce pain when in WB positions.    Active       PT Problem       he will be  independent with his HEP (Met)       Start:  10/31/24    Expected End:  11/07/24    Resolved:  11/21/24         increase R HS flexibility to 70 degrees with SLR       Start:  10/31/24    Expected End:  11/21/24            increase R hip abd strength by 1/2 grade so he can perform transfers without pain       Start:  10/31/24    Expected End:  12/12/24            completed full active lumbar ext without pain (Met)       Start:  10/31/24    Expected End:  11/28/24    Resolved:  11/21/24           PLAN:  Continued with current exercise

## 2024-12-05 ENCOUNTER — TREATMENT (OUTPATIENT)
Dept: PHYSICAL THERAPY | Facility: CLINIC | Age: 69
End: 2024-12-05
Payer: MEDICARE

## 2024-12-05 DIAGNOSIS — M54.50 LOW BACK PAIN: ICD-10-CM

## 2024-12-05 DIAGNOSIS — M51.369 DISC DEGENERATION, LUMBAR: ICD-10-CM

## 2024-12-05 PROCEDURE — 97110 THERAPEUTIC EXERCISES: CPT | Mod: GP | Performed by: PHYSICAL THERAPIST

## 2024-12-05 PROCEDURE — 97140 MANUAL THERAPY 1/> REGIONS: CPT | Mod: GP | Performed by: PHYSICAL THERAPIST

## 2024-12-05 NOTE — PROGRESS NOTES
Physical Therapy Treatment/Discharge    Patient Name: Hermann Lee  MRN: [id number]  Today's Date: 12/5/2024  Visit 5/mn  DOS/DOI:  4/1/24  Medicare Certification Dates:  10/31/24 - 12/31/24  Referred by:   Cesilia Bynum  Time Calculation  Start Time: 1133  Stop Time: 1220  Time Calculation (min): 47 min     PT Therapeutic Procedures Time Entry  Manual Therapy Time Entry: 10  Therapeutic Exercise Time Entry: 37                 Diagnosis:   1. Low back pain  Follow Up In Physical Therapy      2. Disc degeneration, lumbar  Follow Up In Physical Therapy            PRECAUTIONS:  none    SUBJECTIVE:  Continues to report 1-2/10 LBP.  It is worse in the morning and improves as he moves around.  By evening the pain is a 0-1/10.  He felt good after his last session.  He had exercises soreness but no increased pain.  HEP compliance: yes    OBJECTIVE:  - Lumbar AROM = WFL all dir without pain  - Negative slump, B SLR  - TA = strong  - HS flexibility with SLR approximately 60 degrees B  - hip abd MMT = 4+/5 B  - HS flexibility with SLR = 70 L and 60 R  - Oswestry = 2% (8% at eval)    Treatment:  - continued with core stab exercise  Therapeutic Exercise  Therapeutic Exercise Activity 1: SKTC B, 30 sec x3  Therapeutic Exercise Activity 2: Oblique knee tap 2x10  Therapeutic Exercise Activity 3: knee tap 3 x10  Therapeutic Exercise Activity 4: bridge 3x10  Therapeutic Exercise Activity 5: hip abd on multi hip machine, B, L3 2x10  Therapeutic Exercise Activity 6: quad LE ext x 10 B  Therapeutic Exercise Activity 7: supine SB for obliques, B x10  Therapeutic Exercise Activity 8: row, 40 lbs 2x10  Therapeutic Exercise Activity 9: shld ext in standing, 35 lbs 2x10  Manual Therapy  Manual Therapy Activity 1: B lower lumbar SB mob           - his HEP was updated as seen below:    Access Code: [email or web address]  URL: [email or web address]  Date: 12/05/2024  Prepared by: Lili Hubbard    Exercises  - Supine Bridge  - 1 x  daily - 4 x weekly - 3 sets - 10 reps  - Sidelying Hip Abduction  - 1 x daily - 4 x weekly - 3 sets - 10 reps  - Curl Up with Reach  - 1 x daily - 4 x weekly - 3 sets - 10 reps  - Diagonal Curl Up with Reach  - 1 x daily - 4 x weekly - 3 sets - 10 reps  - Quadruped Alternating Leg Extensions  - 1 x daily - 4 x weekly - 3 sets - 10 reps  - Standing Shoulder Row with Anchored Resistance  - 1 x daily - 4 x weekly - 3 sets - 10 reps  - Shoulder extension with resistance - Neutral  - 1 x daily - 4 x weekly - 3 sets - 10 reps    ASSESSMENT:  J Luis rx well.  He has improved in function but still has 0-2/10 pain in the morning that reduces as he moves and gets his day started.  He has progressed well and is able to continue independently with a HEP for long term management of the deg changes at the LB.    Resolved       PT Problem       he will be independent with his HEP (Met)       Start:  10/31/24    Expected End:  11/07/24    Resolved:  11/21/24         increase R HS flexibility to 70 degrees with SLR (Not met)       Start:  10/31/24    Expected End:  11/21/24    Resolved:  12/05/24         increase R hip abd strength by 1/2 grade so he can perform transfers without pain       Start:  10/31/24    Expected End:  12/12/24            completed full active lumbar ext without pain (Met)       Start:  10/31/24    Expected End:  11/28/24    Resolved:  11/21/24           PLAN:  Discharge to HEP